# Patient Record
Sex: MALE | Race: WHITE | NOT HISPANIC OR LATINO | URBAN - METROPOLITAN AREA
[De-identification: names, ages, dates, MRNs, and addresses within clinical notes are randomized per-mention and may not be internally consistent; named-entity substitution may affect disease eponyms.]

---

## 2020-07-01 ENCOUNTER — HOSPITAL ENCOUNTER (OUTPATIENT)
Dept: RADIOLOGY | Facility: CLINIC | Age: 75
Discharge: HOME | End: 2020-07-01
Attending: PODIATRIST
Payer: MEDICARE

## 2020-07-01 ENCOUNTER — OFFICE VISIT (OUTPATIENT)
Dept: PODIATRY | Facility: CLINIC | Age: 75
End: 2020-07-01
Payer: MEDICARE

## 2020-07-01 VITALS
WEIGHT: 193 LBS | BODY MASS INDEX: 24.77 KG/M2 | TEMPERATURE: 98 F | DIASTOLIC BLOOD PRESSURE: 84 MMHG | HEART RATE: 60 BPM | SYSTOLIC BLOOD PRESSURE: 184 MMHG | HEIGHT: 74 IN

## 2020-07-01 DIAGNOSIS — R26.2 DIFFICULTY IN WALKING, NOT ELSEWHERE CLASSIFIED: ICD-10-CM

## 2020-07-01 DIAGNOSIS — M79.671 RIGHT FOOT PAIN: Primary | ICD-10-CM

## 2020-07-01 DIAGNOSIS — M77.8 EXTENSOR TENDONITIS OF FOOT: ICD-10-CM

## 2020-07-01 DIAGNOSIS — M19.90 ARTHRITIS: ICD-10-CM

## 2020-07-01 PROCEDURE — 73630 X-RAY EXAM OF FOOT: CPT | Mod: PBBFAC,RT | Performed by: PODIATRIST

## 2020-07-01 PROCEDURE — 99203 PR OFFICE/OUTPT VISIT, NEW, LEVL III, 30-44 MIN: ICD-10-PCS | Mod: 25,S$PBB,, | Performed by: PODIATRIST

## 2020-07-01 PROCEDURE — 73630 XR FOOT COMPLETE 3 VIEW RIGHT: ICD-10-PCS | Mod: 26,S$PBB,RT, | Performed by: PODIATRIST

## 2020-07-01 PROCEDURE — 99205 OFFICE O/P NEW HI 60 MIN: CPT | Mod: 25 | Performed by: PODIATRIST

## 2020-07-01 PROCEDURE — 99203 OFFICE O/P NEW LOW 30 MIN: CPT | Mod: 25,S$PBB,, | Performed by: PODIATRIST

## 2020-07-01 RX ORDER — DUTASTERIDE 0.5 MG/1
0.5 CAPSULE, LIQUID FILLED ORAL
COMMUNITY
Start: 2013-10-16

## 2020-07-01 RX ORDER — PREDNISOLONE ACETATE 10 MG/ML
SUSPENSION/ DROPS OPHTHALMIC
COMMUNITY
Start: 2019-07-31

## 2020-07-01 RX ORDER — KETOROLAC TROMETHAMINE 5 MG/ML
1 SOLUTION OPHTHALMIC
COMMUNITY
Start: 2019-07-31 | End: 2020-07-30

## 2020-07-01 RX ORDER — DOXYCYCLINE 100 MG/1
100 CAPSULE ORAL EVERY 12 HOURS
Qty: 20 CAPSULE | Refills: 0 | Status: SHIPPED | OUTPATIENT
Start: 2020-07-01

## 2020-07-01 RX ORDER — IRON POLYSACCHARIDE COMPLEX 150 MG
150 CAPSULE ORAL
COMMUNITY

## 2020-07-01 RX ORDER — WARFARIN SODIUM 5 MG/1
5 TABLET ORAL
COMMUNITY

## 2020-07-01 RX ORDER — OMEPRAZOLE 20 MG/1
CAPSULE, DELAYED RELEASE ORAL
COMMUNITY
Start: 2020-04-11

## 2020-07-01 RX ORDER — LOSARTAN POTASSIUM 50 MG/1
25 TABLET ORAL
COMMUNITY
Start: 2012-06-29

## 2020-07-01 RX ORDER — ASPIRIN 325 MG
50000 TABLET, DELAYED RELEASE (ENTERIC COATED) ORAL
COMMUNITY
Start: 2020-06-24

## 2020-07-01 RX ORDER — RIVAROXABAN 20 MG/1
TABLET, FILM COATED ORAL
COMMUNITY
Start: 2020-05-29

## 2020-07-01 NOTE — PROGRESS NOTES
[address] [facility] [address] [place] [address]  Phone: [phone]   [phone]    Patient's PCP:Primary Doctor No  Referring Provider: No ref. provider found    Subjective:      Chief Complaint:: Foot Pain (right foot swollen and red)    MAKENZIE Lincoln is a 74 y.o. male who presents with a complaint of  Right foot red and swollen top of foot lasting for 6-30-20. Onset of the symptoms was none.  Current symptoms include pain ,redness and swelling.  Aggravating factors are standing and walking. Symptoms have remained the same.Treatment to date have included shoes and epson salt soaks. Patients rates pain  3/10 on pain scale.Patient's blood pressure elevated states he took medication this morning. Will monitor at home.      Vitals:    07/01/20 1047   BP: (!) 184/84   Pulse: 60   Temp:      Shoe Size: 11 m    Past Surgical History:   Procedure Laterality Date    stent       Past Medical History:   Diagnosis Date    Hyperlipidemia     Hypertension     Parkinson disease      History reviewed. No pertinent family history.     Social History:   Marital Status:   Alcohol History:  has no history on file for alcohol.  Tobacco History:  has no history on file for tobacco.  Drug History:  has no history on file for drug.    Review of patient's allergies indicates:  No Known Allergies    Current Outpatient Medications   Medication Sig Dispense Refill    aspirin-calcium carbonate 81 mg-300 mg calcium(777 mg) Tab Take 81 mg by mouth.      cholecalciferol, vitamin D3, 1,250 mcg (50,000 unit) capsule Take 50,000 Units by mouth every 7 days.      dutasteride (AVODART) 0.5 mg capsule Take 0.5 mg by mouth.      losartan (COZAAR) 50 MG tablet Take 25 mg by mouth.      rivaroxaban (XARELTO) 20 mg Tab Take 20 mg by mouth.      doxycycline (VIBRAMYCIN) 100 MG Cap Take 1 capsule (100 mg total) by mouth every 12 (twelve) hours. 20 capsule 0    iron polysaccharides (NIFEREX) 150 mg iron Cap Take 150 mg by mouth.       ketorolac 0.5% (ACULAR) 0.5 % Drop Apply 1 drop to eye.      omeprazole (PRILOSEC) 20 MG capsule       prednisoLONE acetate (PRED FORTE) 1 % DrpS 1 drop 4 x day in both eyes      warfarin (COUMADIN) 5 MG tablet Take 5 mg by mouth.      XARELTO 20 mg Tab        No current facility-administered medications for this visit.        Review of Systems      Objective:        Physical Exam:   Foot Exam  Physical Exam       Moderate amount of swelling located over the dorsal aspect of the right foot.  No redness.  No pain with palpation or pressure of the area.   Minor amount of pain elicited with dorsiflexion against resistance of the right foot.  Otherwise, Adequate joint range of motion without pain, limitation, nor crepitation Bilateral feet and ankle joints. Muscle strength is 5/5 in all groups bilaterally.    Skin is normal age and health appropriate color, turgor, texture, and temperature bilateral lower extremities without ulceration, hyperpigmentation, discoloration, masses nodules or cords palpated.  No ecchymosis, erythema, edema, or cardinal signs of infection bilateral lower extremities.       Protective sensation intact by 10 g monofilament all ten toes/both feet.  Pain sensation intact bilateral feet and legs.    DP and PT pulses 2/4 bilateral, capillary refill 3 seconds all toes/distal feet, all toes/both feet warm to touch.   Negative lower extremity edema bilateral.  Imaging:     An AP, oblique, and lateral view of right foot was obtained and reviewed.  There is no evidence of fracture or dislocation in the foot.    The joint spaces appear moderately decreased.    Electronically Signed By: Asmita High DPM         Assessment:       1. Right foot pain - Right Foot    2. Difficulty in walking, not elsewhere classified - Right Foot    3. Arthritis    4. Extensor tendonitis of foot - Right Foot      Plan:   Right foot pain - Right Foot  -     X-Ray Foot Complete Right  -     doxycycline (VIBRAMYCIN)  100 MG Cap; Take 1 capsule (100 mg total) by mouth every 12 (twelve) hours.  Dispense: 20 capsule; Refill: 0  -     SURGERY SHOE FOR HOME USE    Difficulty in walking, not elsewhere classified - Right Foot    Arthritis    Extensor tendonitis of foot - Right Foot      No follow-ups on file.    Procedures - None    Counseling:     I provided patient education verbally regarding:   Patient diagnosis, treatment options, as well as alternatives, risks, and benefits.     This note was created using Dragon voice recognition software that occasionally misinterpreted phrases or words.     Prescribed oral antibiotic as suppressive dose.  Patient does not have any other signs of infection, or open area, but patient states he is concerned about a possible infection.  Therefore, will prescribe an antibiotic to see if the swelling and redness decreases.    Put patient in an Ace wrap in a surgery shoe.  Concern for right foot extensor tendonitis.     Treatment of tendonitis with rest, ice, surgical shoe,  and MRI at the 1 month follow up if needed.